# Patient Record
Sex: MALE | Race: WHITE | HISPANIC OR LATINO | Employment: FULL TIME | URBAN - METROPOLITAN AREA
[De-identification: names, ages, dates, MRNs, and addresses within clinical notes are randomized per-mention and may not be internally consistent; named-entity substitution may affect disease eponyms.]

---
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
## 2021-11-21 ENCOUNTER — HOSPITAL ENCOUNTER (EMERGENCY)
Facility: MEDICAL CENTER | Age: 21
End: 2021-11-21
Attending: EMERGENCY MEDICINE

## 2021-11-21 ENCOUNTER — APPOINTMENT (OUTPATIENT)
Dept: RADIOLOGY | Facility: MEDICAL CENTER | Age: 21
End: 2021-11-21
Attending: EMERGENCY MEDICINE

## 2021-11-21 VITALS
RESPIRATION RATE: 16 BRPM | HEART RATE: 59 BPM | SYSTOLIC BLOOD PRESSURE: 122 MMHG | DIASTOLIC BLOOD PRESSURE: 59 MMHG | TEMPERATURE: 98.9 F | OXYGEN SATURATION: 97 % | WEIGHT: 142.86 LBS

## 2021-11-21 DIAGNOSIS — S09.90XA CLOSED HEAD INJURY, INITIAL ENCOUNTER: ICD-10-CM

## 2021-11-21 PROCEDURE — 70450 CT HEAD/BRAIN W/O DYE: CPT

## 2021-11-21 PROCEDURE — 99284 EMERGENCY DEPT VISIT MOD MDM: CPT | Mod: EDC

## 2021-11-21 ASSESSMENT — LIFESTYLE VARIABLES
HAVE PEOPLE ANNOYED YOU BY CRITICIZING YOUR DRINKING: NO
EVER FELT BAD OR GUILTY ABOUT YOUR DRINKING: NO
EVER HAD A DRINK FIRST THING IN THE MORNING TO STEADY YOUR NERVES TO GET RID OF A HANGOVER: NO
TOTAL SCORE: 0
HAVE YOU EVER FELT YOU SHOULD CUT DOWN ON YOUR DRINKING: NO
DO YOU DRINK ALCOHOL: NO
DOES PATIENT WANT TO STOP DRINKING: NO
CONSUMPTION TOTAL: INCOMPLETE
TOTAL SCORE: 0
TOTAL SCORE: 0

## 2021-11-21 ASSESSMENT — FIBROSIS 4 INDEX: FIB4 SCORE: 0.21

## 2021-11-22 NOTE — ED NOTES
Discharge teaching done with pt and pt's family, verbalized understanding. No prescriptions given. Educated on use of OTC tylenol and motrin, use of ice for hematoma, limiting screen time, and return to sports precautions. Pt instructed to return to ER for any worsening condition, but follow up with primary doctor for recheck. Pt denies further questions or concerns at time of discharge. Denies headache or dizziness. IV removed, catheter intact, no hematoma noted. Pt ambulates out with steady gait with family. VSS.

## 2021-11-22 NOTE — ED TRIAGE NOTES
.  Chief Complaint   Patient presents with   • Dizziness      Pt BIB EMS to triage. Per report Pt was playing soccer when he was pushed from behind, Pt hit his head on concrete wall, +LOC. Pt notes dizziness and tenderness on the right side of head. AAO x 4 now.   Pt educated on triage process and returned to lobby

## 2021-11-22 NOTE — ED PROVIDER NOTES
"ED Provider Note    Scribed for Dr. Gavin Franklin M.D. by Arben Alvarez. 11/21/2021  9:57 PM    Primary care provider: Patient reports no primary care at this time.  Means of arrival: EMS  History obtained from: Patient  History limited by: None    CHIEF COMPLAINT  Chief Complaint   Patient presents with   • Dizziness     HPI  Garfield Tom is a 21 y.o. male who presents to the Emergency Department for an acute head injury onset 2.5 hours ago. Patient reports this occurred when playing soccer and hitting his head against a wall. He further reports a loss of consciousness as well as \"not remembering anything after hitting his head\". Patient reports associated loss of consciousness, and dizziness. There are no alleviating or exacerbating factors reported at this time. He denies associated fever or chills.      REVIEW OF SYSTEMS  Pertinent positives include acute head injury, loss of consciousness, dizziness. Pertinent negatives include no fever or chills. As above, all other systems reviewed and are negative.   See HPI for further details.     PAST MEDICAL HISTORY       SURGICAL HISTORY  patient denies any surgical history    SOCIAL HISTORY  Social History     Tobacco Use   • Smoking status: Never Smoker   • Smokeless tobacco: Never Used   Vaping Use   • Vaping Use: Never used   Substance Use Topics   • Alcohol use: Not Currently   • Drug use: Never      Social History     Substance and Sexual Activity   Drug Use Never       FAMILY HISTORY  History reviewed. No pertinent family history.    CURRENT MEDICATIONS  Home Medications     Reviewed by Gail Scott R.N. (Registered Nurse) on 11/21/21 at 2012  Med List Status: Not Addressed   Medication Last Dose Status   famotidine (PEPCID) 20 MG Tab  Active   omeprazole (PRILOSEC) 20 MG delayed-release capsule  Active                ALLERGIES  No Known Allergies    PHYSICAL EXAM  VITAL SIGNS: /74   Pulse 69   Temp 37.1 °C (98.8 °F) (Temporal)   Resp 15  "  Wt 64.8 kg (142 lb 13.7 oz)   SpO2 97%     Constitutional: Well developed, Well nourished, mild distress, Non-toxic appearance.   HENT: Normocephalic, Bilateral external ears normal, Oropharynx moist, No oral exudates. Minor abrasion and tenderness to right parietal area   Eyes: PERRLA, EOMI, Conjunctiva normal, No discharge.   Neck: No tenderness, Supple, No stridor.   Lymphatic: No lymphadenopathy noted.   Cardiovascular: Normal heart rate, Normal rhythm.   Thorax & Lungs: Clear to auscultation bilaterally, No respiratory distress, No wheezing, No crackles.   Abdomen: Soft, No tenderness, No masses, No pulsatile masses.   Skin: Warm, Dry, No erythema, No rash.   Extremities:, No edema No cyanosis.   Musculoskeletal: No tenderness to palpation or major deformities noted.  Intact distal pulses  Neurologic: Awake, alert. Moves all extremities spontaneously.  Psychiatric: Affect normal, Judgment normal, Mood normal.     RADIOLOGY  CT-HEAD W/O   Final Result      No acute intracranial abnormality.           The radiologist's interpretation of all radiological studies have been reviewed by me.    COURSE & MEDICAL DECISION MAKING  Pertinent Labs & Imaging studies reviewed. (See chart for details)    9:57 PM - Patient seen and examined at bedside. Ordered CT-Head w/o to evaluate his symptoms. The differential diagnoses include but are not limited to: intercranial hemorrhage vs minor traumatic brain injury.     11:04 PM - Patient was reevaluated at bedside. I then informed the patient of my plan for discharge, which includes strict return precautions for any new or worsening symptoms. He understands and verbalizes agreement to plan of care. He is comfortable going home at this time.       Decision Making:  The patient seen for head injury struck the right side of his head with short loss of consciousness and some dizziness which is largely resolved work-up is negative negative CT scan negative exam    The patient will  return for new or worsening symptoms and is stable at the time of discharge.    The patient is referred to a primary physician for blood pressure management, diabetic screening, and for all other preventative health concerns.    DISPOSITION:  Patient will be discharged home in stable condition.    FOLLOW UP:  No follow-up provider specified.    FINAL IMPRESSION  1. Closed head injury, initial encounter          Arben CLARK (Scribe), am scribing for, and in the presence of, Gavin Franklin M.D..    Electronically signed by: Arben Alvarez ([email or web address]), 11/21/2021    Gavin CLARK M.D. personally performed the services described in this documentation, as scribed by Arben Alvarez in my presence, and it is both accurate and complete.    The note accurately reflects work and decisions made by me.  Gavin Franklin M.D.  11/21/2021  11:07 PM

## 2021-11-22 NOTE — ED NOTES
Pt to bed 52 via wheelchair. Pt A+Ox4. Skin p/w/d. Respirations easy, unlabored. Pt reports injury was at 1930, reports a few second loss of consciousness. Denies headache, reports dizziness improved. Pt c/o pain to R side of head where hematoma, small abrasion noted. Ice pack applied.   Chart up for MD to see.